# Patient Record
Sex: MALE | Race: BLACK OR AFRICAN AMERICAN | Employment: UNEMPLOYED | URBAN - METROPOLITAN AREA
[De-identification: names, ages, dates, MRNs, and addresses within clinical notes are randomized per-mention and may not be internally consistent; named-entity substitution may affect disease eponyms.]

---

## 2022-09-19 ENCOUNTER — HOSPITAL ENCOUNTER (EMERGENCY)
Age: 47
Discharge: HOME | End: 2022-09-19

## 2022-09-19 VITALS
WEIGHT: 150 LBS | RESPIRATION RATE: 18 BRPM | BODY MASS INDEX: 21.47 KG/M2 | DIASTOLIC BLOOD PRESSURE: 91 MMHG | HEIGHT: 70 IN | OXYGEN SATURATION: 98 % | TEMPERATURE: 99.3 F | SYSTOLIC BLOOD PRESSURE: 164 MMHG | HEART RATE: 76 BPM

## 2022-09-19 DIAGNOSIS — Z20.2 STD EXPOSURE: Primary | ICD-10-CM

## 2022-09-19 LAB
BACTERIA: ABNORMAL /HPF
BILIRUBIN URINE: NEGATIVE MG/DL
BLOOD, URINE: NEGATIVE
CLARITY: CLEAR
COLOR: YELLOW
GLUCOSE, URINE: NEGATIVE MG/DL
GRANULAR CASTS: 1 /LPF
KETONES, URINE: NEGATIVE MG/DL
LEUKOCYTE ESTERASE, URINE: NEGATIVE
NITRITE URINE, QUANTITATIVE: NEGATIVE
PH, URINE: 5.5 (ref 5–8)
PROTEIN UA: NEGATIVE MG/DL
RBC URINE: ABNORMAL /HPF (ref 0–3)
SPECIFIC GRAVITY UA: >1.03 (ref 1–1.03)
TRICHOMONAS: ABNORMAL /HPF
UROBILINOGEN, URINE: 0.2 MG/DL (ref 0.2–1)
WBC UA: ABNORMAL /HPF (ref 0–2)

## 2022-09-19 PROCEDURE — 87491 CHLMYD TRACH DNA AMP PROBE: CPT

## 2022-09-19 PROCEDURE — 81001 URINALYSIS AUTO W/SCOPE: CPT

## 2022-09-19 PROCEDURE — 96372 THER/PROPH/DIAG INJ SC/IM: CPT

## 2022-09-19 PROCEDURE — 6360000002 HC RX W HCPCS

## 2022-09-19 PROCEDURE — 99284 EMERGENCY DEPT VISIT MOD MDM: CPT

## 2022-09-19 PROCEDURE — 87591 N.GONORRHOEAE DNA AMP PROB: CPT

## 2022-09-19 PROCEDURE — 2500000003 HC RX 250 WO HCPCS

## 2022-09-19 PROCEDURE — 86593 SYPHILIS TEST NON-TREP QUANT: CPT

## 2022-09-19 PROCEDURE — 6370000000 HC RX 637 (ALT 250 FOR IP)

## 2022-09-19 PROCEDURE — 86780 TREPONEMA PALLIDUM: CPT

## 2022-09-19 PROCEDURE — 86592 SYPHILIS TEST NON-TREP QUAL: CPT

## 2022-09-19 RX ORDER — AZITHROMYCIN 250 MG/1
1000 TABLET, FILM COATED ORAL ONCE
Status: COMPLETED | OUTPATIENT
Start: 2022-09-19 | End: 2022-09-19

## 2022-09-19 RX ORDER — METRONIDAZOLE 250 MG/1
2000 TABLET ORAL ONCE
Status: COMPLETED | OUTPATIENT
Start: 2022-09-19 | End: 2022-09-19

## 2022-09-19 RX ADMIN — LIDOCAINE HYDROCHLORIDE 500 MG: 10 INJECTION, SOLUTION INFILTRATION; PERINEURAL at 20:40

## 2022-09-19 RX ADMIN — AZITHROMYCIN MONOHYDRATE 1000 MG: 250 TABLET ORAL at 19:59

## 2022-09-19 RX ADMIN — METRONIDAZOLE 2000 MG: 250 TABLET ORAL at 20:00

## 2022-09-19 ASSESSMENT — PAIN SCALES - GENERAL: PAINLEVEL_OUTOF10: 0

## 2022-09-19 ASSESSMENT — PAIN - FUNCTIONAL ASSESSMENT
PAIN_FUNCTIONAL_ASSESSMENT: NONE - DENIES PAIN
PAIN_FUNCTIONAL_ASSESSMENT: 0-10

## 2022-09-19 NOTE — DISCHARGE INSTRUCTIONS
You will need to have follow up testing at 2 weeks, 2 months and 6 months. General Sunscreen Counseling: I recommended a broad spectrum sunscreen with a SPF of 30 or higher.  I explained that SPF 30 sunscreens block approximately 97 percent of the sun's harmful rays.  Sunscreens should be applied at least 15 minutes prior to expected sun exposure and then every 2 hours after that as long as sun exposure continues. If swimming or exercising sunscreen should be reapplied every 45 minutes to an hour after getting wet or sweating.  One ounce, or the equivalent of a shot glass full of sunscreen, is adequate to protect the skin not covered by a bathing suit. I also recommended a lip balm with a sunscreen as well. Sun protective clothing can be used in lieu of sunscreen but must be worn the entire time you are exposed to the sun's rays. Detail Level: Zone

## 2022-09-21 LAB
RPR TITER: NORMAL
RPR: REACTIVE

## 2022-09-22 LAB
CHLAMYDIA TRACHOMATIS AMPLIFIED DET: NEGATIVE
N GONORRHOEAE AMPLIFIED DET: NEGATIVE

## 2022-09-23 ENCOUNTER — TELEPHONE (OUTPATIENT)
Dept: PHARMACY | Age: 47
End: 2022-09-23

## 2022-09-23 NOTE — TELEPHONE ENCOUNTER
Pharmacy Note  ED Culture Follow-up    Sophia Reeder is a 56 y.o. male. Allergies: Patient has no known allergies. Labs:  No results found for: BUN, CREATININE, WBC  CrCl cannot be calculated (No successful lab value found. ). Current antimicrobials:   None    ASSESSMENT:  Micro results:   RPR Reactive     PLAN:  Need for intervention: Yes  Discussed with: Dr. Deloris Alfonso treatment:    Unable to reach patient to inform of positive result and need to return to ED for treatment. Patient response:   Call attempt #3, did not reach patient. Unable to reach patient after 3 call attempts, sent letter on 9/23/22    Called/sent in prescription to: Not applicable    Please call with any questions.  Manisha Fry [facility] [place], PharmD 1:31 PM 9/23/2022

## 2022-09-23 NOTE — PROGRESS NOTES
Pharmacy Note  ED Culture Follow-up    Telford Rubinstein is a 56 y.o. male. Allergies: Patient has no known allergies. Labs:  No results found for: BUN, CREATININE, WBC  CrCl cannot be calculated (No successful lab value found. ). Current antimicrobials:   None    ASSESSMENT:  Micro results:   RPR Reactive     PLAN:  Need for intervention: Yes  Discussed with: Dr. Justen Mota treatment:    Unable to reach patient to inform of positive result and need to return to ED for treatment. Patient response:   Call attempt #3, did not reach patient. Unable to reach patient after 3 call attempts, sent letter on 9/23/22    Called/sent in prescription to: Not applicable    Please call with any questions.  Danna Kebede [facility] [place], PharmD 1:31 PM 9/23/2022

## 2022-09-23 NOTE — ED PROVIDER NOTES
[address] ENCOUNTER        Pt Name: Santiago Rodney  MRN: [id number]  [place] 1975  Date of evaluation: 9/19/2022  Provider: NISHANT Barnard CNP  PCP: No primary care provider on file. Note Started: 6:02 PM EDT      TACHO. I have evaluated this patient. My supervising physician was available for consultation. Triage CHIEF COMPLAINT       Chief Complaint   Patient presents with    Exposure to STD     States partner notified him today she was positive for syphillis         HISTORY OF PRESENT ILLNESS   (Location/Symptom, Timing/Onset, Context/Setting, Quality, Duration, Modifying Factors, Severity)  Note limiting factors. Chief Complaint: STD exposure    Santiago Rodney is a 56 y.o. male who presents top the ED with concerns of STD exposure. Pt states that he was advised that he was informed by partner that they were positive for syphilis. Patient states that most recent sexual encounter was 2 weeks ago. Patient's dates that he is asymptomatic and would like to be tested. Nursing Notes were all reviewed and agreed with or any disagreements were addressed in the HPI. REVIEW OF SYSTEMS    (2-9 systems for level 4, 10 or more for level 5)     General/ Constitutional: No fever, no chills  Skin: No rashes, wounds, or lesions  Respiratory: no cough, no shortness of breath  Cardiac: no chest pain, no lower leg swelling  GI: no abdominal pain, nausea, vomiting, or diarrhea  Urinary: No hematuria, dysuria, or foul smell to urine  Genital: Pt denies any painful erections, penile discharge, scrotal/testicular pain or swelling. Psychiatric: pt denies any depression, SI or HI thoughts. PAST MEDICAL HISTORY   No past medical history on file. SURGICAL HISTORY   No past surgical history on file. CURRENTMEDICATIONS     There are no discharge medications for this patient.       ALLERGIES Patient has no known allergies. FAMILYHISTORY     No family history on file. SOCIAL HISTORY       Social History     Socioeconomic History    Marital status: Single   Tobacco Use    Smoking status: Never   Substance and Sexual Activity    Alcohol use: No    Drug use: Yes     Types: Marijuana (Weed)    Sexual activity: Yes     Partners: Female       SCREENINGS    Alborn Coma Scale  Eye Opening: Spontaneous  Best Verbal Response: Oriented  Best Motor Response: Obeys commands  Alborn Coma Scale Score: 15        PHYSICAL EXAM    (up to 7 for level 4, 8 or more for level 5)     ED Triage Vitals [09/19/22 1630]   BP Temp Temp Source Heart Rate Resp SpO2 Height Weight   (!) 149/134 99.3 °F (37.4 °C) Oral (!) 104 20 96 % 5' 10\" (1.778 m) 150 lb (68 kg)       General Appearance: Patient is alert and oriented x4. Pt is laying in semi-garcia's position. Patient appears well-hydrated and well-nourished. Patient is well groomed and dressed appropriately. Skin: Warm and dry, no pallor, no erythema or rashes  Head: Normocephalic and atraumatic  Eyes: EOMI, no discharge  Ears: Hears all normal conversation,  Nose/Sinuses: No congestion or rhinorrhea, no external signs of trauma or deviation  Mouth/ Throat: Lips are pink and moist  Anterior/Posterior thorax and lungs: Respirations are even and unlabored, lung zones are clear in all fields, no wheezing rhonchi or stridor  Cardic: S1-S2 noted with no murmurs gallops or rubs regular rhythm and rate  Abdomen: Abdomen is soft, nondistended, nontender to palpation. Bowel sounds are active in all quadrants  Genitourinary: No right or left CVA tenderness, no suprapubic tenderness, genital exam was deferred at this time due to lack of complaint. Musculoskeletal: Patient walks with steady gait to bathroom to obtain urine sample.   No obvious deformities or lower leg swelling   Psychosocial: Patient is calm and cooperative      DIAGNOSTIC RESULTS   LABS:    Labs Reviewed URINALYSIS WITH MICROSCOPIC - Abnormal; Notable for the following components:       Result Value    Bacteria, UA RARE (*)     All other components within normal limits   RPR - Abnormal; Notable for the following components:    RPR REACTIVE (*)     All other components within normal limits   C.TRACHOMATIS N.GONORRHOEAE DNA   RPR, QUANT   FTA ANTIBODIES, IGG AND IGM       When ordered, only abnormal lab results are displayed. All other labs were within normal range or not returned as of this dictation. EKG: When ordered, EKG's are interpreted by the Emergency Department Physician in the absence of a cardiologist.  Please see their note for interpretation of EKG. RADIOLOGY:   Non-plain film images such as CT, Ultrasound and MRI are read by the radiologist. Plain radiographic images are visualized andpreliminarily interpreted by the  ED Provider with the below findings:        Interpretation perthe Radiologist below, if available at the time of this note:    No orders to display     No results found. PROCEDURES   Unless otherwise noted below, none     Procedures    CRITICAL CARE TIME   N/A    CONSULTS:  None      EMERGENCY DEPARTMENT COURSE and DIFFERENTIAL DIAGNOSIS/MDM:   Vitals:    Vitals:    09/19/22 1630 09/19/22 1951 09/19/22 2044   BP: (!) 149/134 (!) 166/91 (!) 164/91   Pulse: (!) 104 76    Resp: 20 19 18   Temp: 99.3 °F (37.4 °C)     TempSrc: Oral     SpO2: 96% 97% 98%   Weight: 150 lb (68 kg)     Height: 5' 10\" (1.778 m)         Patient was given thefollowing medications:  Medications   cefTRIAXone (ROCEPHIN) 500 mg in lidocaine 1 % 1.43 mL IM Injection (500 mg IntraMUSCular Given 9/19/22 2040)   azithromycin (ZITHROMAX) tablet 1,000 mg (1,000 mg Oral Given 9/19/22 1959)   metroNIDAZOLE (FLAGYL) tablet 2,000 mg (2,000 mg Oral Given 9/19/22 2000)         Is this patient to be included in the SEP-1 Core Measure due to severe sepsis or septic shock?    No   Exclusion criteria - the patient is NOT to be included for SEP-1 Core Measure due to: Infection is not suspected    Assessment and MDM: Patient was advised of the need for follow-up testing at 2 weeks 2 months and 6 months. Patient also advised to abstain from any sexual activity until results have been received. Patient education provided regarding sexually transmitted infections and condoms. I think patient is appropriate for outpatient management. Patient is given instructions regarding symptomatic care at home as well as return precautions. To call [facility] to establish PCP and schedule for follow up testing. Patient verbalizes understanding of all instructions and is comfortable with the plan of care. I am the Primary Clinician of Record. FINAL IMPRESSION      1. STD exposure          DISPOSITION/PLAN   DISPOSITION Decision To Discharge 09/19/2022 08:44:55 PM      PATIENT REFERREDTO:  [facility]  [address]  [phone]  Schedule an appointment as soon as possible for a visit   for follow up testing. DISCHARGE MEDICATIONS:  There are no discharge medications for this patient. DISCONTINUED MEDICATIONS:  There are no discharge medications for this patient.              (Please note that portions ofthis note were completed with a voice recognition program.  Efforts were made to edit the dictations but occasionally words are mis-transcribed.)    NISHANT Pino CNP (electronically signed)             NISHNAT Pino CNP  09/23/22 1926

## 2022-09-26 ENCOUNTER — HOSPITAL ENCOUNTER (EMERGENCY)
Age: 47
Discharge: HOME | End: 2022-09-26

## 2022-09-26 VITALS
TEMPERATURE: 98.7 F | OXYGEN SATURATION: 99 % | HEART RATE: 85 BPM | DIASTOLIC BLOOD PRESSURE: 82 MMHG | SYSTOLIC BLOOD PRESSURE: 117 MMHG | RESPIRATION RATE: 16 BRPM

## 2022-09-26 DIAGNOSIS — A53.9 SYPHILIS: Primary | ICD-10-CM

## 2022-09-26 LAB — TREPONEMA PALLIDUM ANTIBODIES: REACTIVE

## 2022-09-26 PROCEDURE — 96372 THER/PROPH/DIAG INJ SC/IM: CPT

## 2022-09-26 PROCEDURE — 99284 EMERGENCY DEPT VISIT MOD MDM: CPT

## 2022-09-26 PROCEDURE — 6360000002 HC RX W HCPCS: Performed by: PHYSICIAN ASSISTANT

## 2022-09-26 RX ADMIN — PENICILLIN G BENZATHINE 2.4 MILLION UNITS: 2400000 INJECTION, SUSPENSION INTRAMUSCULAR at 09:16

## 2022-09-27 NOTE — ED PROVIDER NOTES
Patient has no known allergies. FAMILYHISTORY     History reviewed. No pertinent family history. SOCIAL HISTORY       Social History     Socioeconomic History    Marital status: Single     Spouse name: None    Number of children: None    Years of education: None    Highest education level: None   Tobacco Use    Smoking status: Never   Substance and Sexual Activity    Alcohol use: No    Drug use: Yes     Types: Marijuana Neda Lux)    Sexual activity: Yes     Partners: Female       SCREENINGS    Ralf Coma Scale  Eye Opening: Spontaneous  Best Verbal Response: Oriented  Best Motor Response: Obeys commands  Ralf Coma Scale Score: 15      PHYSICAL EXAM       ED Triage Vitals   BP Temp Temp Source Heart Rate Resp SpO2 Height Weight   09/26/22 0727 09/26/22 0731 09/26/22 0731 09/26/22 0731 09/26/22 0731 09/26/22 0727 -- --   123/84 98.7 °F (37.1 °C) Oral 84 16 100 %        Constitutional:  Well developed, Well nourished. No distress  HENT:  Normocephalic, Atraumatic, PERRL. EOMI. Sclera clear. Conjunctiva normal, No discharge. Neck/Lymphatics: supple, no JVD, no swollen nodes  Cardiovascular:   RRR,  no murmurs/rubs/gallops. Respiratory:   Nonlabored breathing. Normal breath sounds, No wheezing  Abdomen: Bowel sounds normal, Soft, No tenderness, no masses. Musculoskeletal:    There is no edema, asymmetry, or calf / thigh tenderness bilaterally. No cyanosis. No cool or pale-appearing limb. Distal cap refill and pulses intact bilateral upper and lower extremities  Bilateral upper and lower extremity ROM intact without pain or obvious deficit  Integument:   Warm, Dry  Neurologic:  Alert & oriented , No focal deficits noted. Cranial nerves II through XII grossly intact. Normal gross motor coordination & motor strength bilateral upper and lower extremities  Sensation intact.   Psychiatric:  Affect normal, Mood normal.     DIAGNOSTIC RESULTS   LABS:    Labs Reviewed - No data to display    When ordered, only abnormal lab results are displayed. All other labs were within normal range or not returned as of this dictation. EKG: When ordered, EKG's are interpreted by the Emergency Department Physician in the absence of a cardiologist.  Please see their note for interpretation of EKG. RADIOLOGY:   Non-plain film images such as CT, Ultrasound and MRI are read by the radiologist. Plain radiographic images are visualized and preliminarily interpreted by the  ED Provider with the below findings:    Interpretation perthe Radiologist below, if available at the time of this note:    No orders to display     No results found. PROCEDURES   Unless otherwise noted below, none      CRITICAL CARE   CRITICAL CARE NOTE:   N/A    CONSULTS:  None      EMERGENCY DEPARTMENT COURSE and MDM:   Vitals:    Vitals:    09/26/22 0727 09/26/22 0731 09/26/22 0800 09/26/22 0914   BP: 123/84  131/87 117/82   Pulse:  84 75 85   Resp:  16 15 16   Temp:  98.7 °F (37.1 °C)     TempSrc:  Oral     SpO2: 100%  98% 99%       Patient was given thefollowing medications:  Medications   Penicillin G Benzathine (BICILLIN L-A) [id number] UNIT/4ML suspension 2.4 Million Units (2.4 Million Units IntraMUSCular Given 9/26/22 0916)         Is this patient to be included in the SEP-1 Core Measure due to severe sepsis or septic shock? No   Exclusion criteria - the patient is NOT to be included for SEP-1 Core Measure due to:  2+ SIRS criteria are not met    MDM:  Patient presents as above. Emergent etiologies considered. Patient seen and examined. Work-up initiated secondary to presentation, physical exam findings, vital signs and medical chart review. In brief, 60-year-old male presenting the emergency department today with positive RPR and treponema. We will give a IM injection of penicillin 2,400,000 units. 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 We will refer to health department/primary care. Will be advised on serologic testing at 6 and 12 months. Otherwise will be discharged in stable condition    CLINICAL IMPRESSION      1. Syphilis          DISPOSITION/PLAN   DISPOSITION Decision To Discharge 09/26/2022 08:37:52 AM      PATIENT REFERREDTO:  [address] [facility]  [phone]    Mondays 6-8 PM STI clinic; should receive serologic testing 6 months from today's date and then a repeat at the 12 months    Toñito End, APRN - CNP  [address] [place] [address]  [phone]            DISCHARGE MEDICATIONS:  There are no discharge medications for this patient. DISCONTINUED MEDICATIONS:  There are no discharge medications for this patient. (Please note that portions ofthis note were completed with a voice recognition program.  Efforts were made to edit the dictations but occasionally words are mis-transcribed. )    Epifania Dancer, PA (electronically signed)            LYNETTE Rendon  09/27/22 5764